# Patient Record
Sex: FEMALE | Race: WHITE | NOT HISPANIC OR LATINO | Employment: UNEMPLOYED | URBAN - NONMETROPOLITAN AREA
[De-identification: names, ages, dates, MRNs, and addresses within clinical notes are randomized per-mention and may not be internally consistent; named-entity substitution may affect disease eponyms.]

---

## 2024-11-13 NOTE — PROGRESS NOTES
"Patient: Little Ty    YOB: 1985    Date: 11/18/2024    Primary Care Provider: Nallely Sims MD    Chief Complaint   Patient presents with    Hernia       SUBJECTIVE:    History of present illness:  The patient is in the office today for evaluation and treatment of a mass in her abdomen. she states that she was weightlifting and noticed she has\" lumps in her abdomen\". She has a history of an inguinal hernia as a child. The patient states she has bilateral groin pain. She complains of bulging in the area.     The patient has had bilateral inguinal hernias repaired as a child, she does state that she has some pain in the right groin with a palpable lump.  She does work out quite a bit and noticed this recently while working out.    The following portions of the patient's history were reviewed and updated as appropriate: allergies, current medications, past family history, past medical history, past social history, past surgical history and problem list.      Review of Systems   Constitutional:  Negative for chills, fever and unexpected weight change.   HENT:  Negative for hearing loss, trouble swallowing and voice change.    Eyes:  Negative for visual disturbance.   Respiratory:  Negative for apnea, cough, chest tightness, shortness of breath and wheezing.    Cardiovascular:  Negative for chest pain, palpitations and leg swelling.   Gastrointestinal:  Negative for abdominal distention, abdominal pain, anal bleeding, blood in stool, constipation, diarrhea, nausea, rectal pain and vomiting.   Endocrine: Negative for cold intolerance and heat intolerance.   Genitourinary:  Negative for difficulty urinating, dysuria and flank pain.   Musculoskeletal:  Negative for back pain and gait problem.   Skin:  Negative for color change, rash and wound.   Neurological:  Negative for dizziness, syncope, speech difficulty, weakness, light-headedness, numbness and headaches.   Hematological:  Negative " "for adenopathy. Does not bruise/bleed easily.   Psychiatric/Behavioral:  Negative for confusion. The patient is not nervous/anxious.          Allergies:  Allergies   Allergen Reactions    Nsaids Nausea Only       Medications:    Current Outpatient Medications:     b complex vitamins capsule, Take 1 capsule by mouth Daily., Disp: , Rfl:     magnesium oxide (MAG-OX) 400 MG tablet, Take 1 tablet by mouth Daily., Disp: , Rfl:     sucralfate (CARAFATE) 1 g tablet, Take 1 tablet by mouth., Disp: , Rfl:     History:  History reviewed. No pertinent past medical history.    Past Surgical History:   Procedure Laterality Date    HYSTERECTOMY      INGUINAL HERNIA REPAIR Bilateral        Family History   Problem Relation Age of Onset    Breast cancer Mother     Breast cancer Maternal Grandmother        Social History     Tobacco Use    Smoking status: Never    Smokeless tobacco: Never   Vaping Use    Vaping status: Never Used   Substance Use Topics    Alcohol use: Yes    Drug use: Defer        OBJECTIVE:    Vital Signs:   Vitals:    11/18/24 1051   BP: 108/68   Pulse: 78   Temp: 97.7 °F (36.5 °C)   SpO2: 98%   Weight: 66.2 kg (146 lb)   Height: 160 cm (62.99\")       Physical Exam:       Physical Exam:     General Appearance:    Alert, cooperative, in no acute distress   Head:    Normocephalic, without obvious abnormality, atraumatic   Eyes:            Lids and lashes normal, conjunctivae and sclerae normal, no   icterus, no pallor, corneas clear, PERRLA   Ears:    Ears appear intact with no abnormalities noted   Throat:   No oral lesions, no thrush, oral mucosa moist   Neck:   No adenopathy, supple, trachea midline, no thyromegaly, no   carotid bruit, no JVD   Back:     No kyphosis present, no scoliosis present, no skin lesions,      erythema or scars, no tenderness to percussion or                   palpation,   range of motion normal   Lungs:     Clear to auscultation,respirations regular, even and                  unlabored "    Heart:    Regular rhythm and normal rate, normal S1 and S2, no            murmur, no gallop, no rub, no click   Chest Wall:    No abnormalities observed   Abdomen:     Normal bowel sounds, no masses, no organomegaly, soft        non-tender, non-distended, no guarding, tenderness present in the right groin   Extremities:   Moves all extremities well, no edema, no cyanosis, no             redness   Pulses:   Pulses palpable and equal bilaterally   Skin:   No bleeding, bruising or rash   Lymph nodes:   No palpable adenopathy   Neurologic:   Cranial nerves 2 - 12 grossly intact, sensation intact, DTR       present and equal bilaterally       Results Review:   I reviewed the patient's new clinical results.  I reviewed the patient's new imaging results and agree with the interpretation.  I reviewed the patient's other test results and agree with the interpretation    Review of Systems was reviewed and confirmed as accurate as documented by the MA.    ASSESSMENT/PLAN:    1. Umbilical hernia without obstruction and without gangrene    2. Right lower quadrant abdominal pain        I did have a detailed and extensive discussion with the patient in the office today and she did have a right groin ultrasound performed.  This did show evidence of a small inguinal hernia but I am not for sure whether she has a muscle strain or the hernia is causing her symptomatology.  I am concerned that she will eventually need robotic assisted laparoscopic repair but I would first like to see her back in the office in 1 week after having the patient not perform any workouts or heavy lifting.          Electronically signed by Philippe Faith MD  11/25/24

## 2024-11-18 ENCOUNTER — OFFICE VISIT (OUTPATIENT)
Dept: SURGERY | Facility: CLINIC | Age: 39
End: 2024-11-18
Payer: MEDICAID

## 2024-11-18 VITALS
HEART RATE: 78 BPM | DIASTOLIC BLOOD PRESSURE: 68 MMHG | SYSTOLIC BLOOD PRESSURE: 108 MMHG | WEIGHT: 146 LBS | HEIGHT: 63 IN | TEMPERATURE: 97.7 F | OXYGEN SATURATION: 98 % | BODY MASS INDEX: 25.87 KG/M2

## 2024-11-18 DIAGNOSIS — R10.31 RIGHT LOWER QUADRANT ABDOMINAL PAIN: ICD-10-CM

## 2024-11-18 DIAGNOSIS — K42.9 UMBILICAL HERNIA WITHOUT OBSTRUCTION AND WITHOUT GANGRENE: Primary | ICD-10-CM

## 2024-11-18 PROCEDURE — 1160F RVW MEDS BY RX/DR IN RCRD: CPT | Performed by: SURGERY

## 2024-11-18 PROCEDURE — 99204 OFFICE O/P NEW MOD 45 MIN: CPT | Performed by: SURGERY

## 2024-11-18 PROCEDURE — 1159F MED LIST DOCD IN RCRD: CPT | Performed by: SURGERY

## 2024-11-18 RX ORDER — SUCRALFATE 1 G/1
1 TABLET ORAL
COMMUNITY
Start: 2024-07-05

## 2024-11-18 RX ORDER — MAGNESIUM OXIDE 400 MG/1
400 TABLET ORAL DAILY
COMMUNITY

## 2024-11-18 RX ORDER — VITAMIN B COMPLEX
1 CAPSULE ORAL DAILY
COMMUNITY

## 2024-11-19 ENCOUNTER — PATIENT ROUNDING (BHMG ONLY) (OUTPATIENT)
Dept: SURGERY | Facility: CLINIC | Age: 39
End: 2024-11-19
Payer: MEDICAID

## 2024-11-19 NOTE — PROGRESS NOTES
November 19, 2024    Hello, may I speak with Little Ty?    My name is BULMARO DOYLE    I am  with [facility]  [address]  [place] [facility] [place] [address] [phone].    Before we get started may I verify your date of birth? 1985    I am calling to officially welcome you to our practice and ask about your recent visit. Is this a good time to talk? yes    Tell me about your visit with us. What things went well?  IT WAS OK       We're always looking for ways to make our patients' experiences even better. Do you have recommendations on ways we may improve?  yes I THINK HE COULD HAVE TOLD ME WHETHER OR NOT HE THOUGHT I HAD A HERNIA INSTEAD OF HAVING TO COME BACK IN A WEEK    Overall were you satisfied with your first visit to our practice? yes       I appreciate you taking the time to speak with me today. Is there anything else I can do for you? no      Thank you, and have a great day.

## 2024-11-21 NOTE — PROGRESS NOTES
Patient: Little Ty    YOB: 1985    Date: 11/25/2024    Primary Care Provider: Nallely Sims MD    Chief Complaint   Patient presents with    Follow-up     R inguinal/incisional hernia        SUBJECTIVE:    History of present illness:  I saw the patient in the office today as a follow-up consultation for evaluation and treatment of a right inguinal/incisional hernia.  Patient was seen in the office 11/18/2024 at which time she complained of bilateral inguinal pain.  Ultrasound was performed which showed a possible hernia in her right groin area.    She still complains of abdominal pain in the right groin region, there is a palpable mass.    The following portions of the patient's history were reviewed and updated as appropriate: allergies, current medications, past family history, past medical history, past social history, past surgical history and problem list.      Review of Systems:  Constitutional:  Negative for chills, fever, and unexpected weight change.  HENT: Negative for trouble swallowing and voice change.  Eyes:  Negative for visual disturbance.  Respiratory:  Negative for apnea, cough, chest tightness, shortness of breath, and wheezing.  Cardiovascular:  Negative for chest pain, palpitations, and leg swelling.  Gastrointestinal:  Negative for abdominal distention, abdominal pain, anal bleeding, blood in stool, constipation, diarrhea, nausea, rectal pain, and vomiting.  Musculoskeletal:  Negative for back pain, gait problem, and joint swelling.  Skin:  Negative for color change, rash, and wound  Neurological:  Negative for dizziness, syncope, speech difficulty, weakness, numbness, and headaches.  Hematological:  Negative for adenopathy.  Does not bruise/bleed easily.  Psychiatric/Behavioral:  Negative for confusion.  The patient is not nervous/anxious.          History:  History reviewed. No pertinent past medical history.    Past Surgical History:   Procedure Laterality  "Date    HYSTERECTOMY      INGUINAL HERNIA REPAIR Bilateral        Family History   Problem Relation Age of Onset    Breast cancer Mother     Breast cancer Maternal Grandmother        Social History     Tobacco Use    Smoking status: Never    Smokeless tobacco: Never   Vaping Use    Vaping status: Never Used   Substance Use Topics    Alcohol use: Yes    Drug use: Defer       Allergies:  Allergies   Allergen Reactions    Nsaids Nausea Only       Medications:    Current Outpatient Medications:     b complex vitamins capsule, Take 1 capsule by mouth Daily., Disp: , Rfl:     magnesium oxide (MAG-OX) 400 MG tablet, Take 1 tablet by mouth Daily., Disp: , Rfl:     sucralfate (CARAFATE) 1 g tablet, Take 1 tablet by mouth., Disp: , Rfl:     OBJECTIVE:    Vital Signs:   Vitals:    11/25/24 0845   BP: 104/72   Pulse: 70   Temp: 98 °F (36.7 °C)   SpO2: 98%   Weight: 66.2 kg (146 lb)   Height: 160 cm (62.99\")         Physical Exam:     General Appearance:    Alert, cooperative, in no acute distress   Head:    Normocephalic, without obvious abnormality, atraumatic   Eyes:            Lids and lashes normal, conjunctivae and sclerae normal, no   icterus, no pallor, corneas clear, PERRLA   Ears:    Ears appear intact with no abnormalities noted   Throat:   No oral lesions, no thrush, oral mucosa moist   Neck:   No adenopathy, supple, trachea midline, no thyromegaly, no   carotid bruit, no JVD   Back:     No kyphosis present, no scoliosis present, no skin lesions,      erythema or scars, no tenderness to percussion or                   palpation,   range of motion normal   Lungs:     Clear to auscultation,respirations regular, even and                  unlabored    Heart:    Regular rhythm and normal rate, normal S1 and S2, no            murmur, no gallop, no rub, no click   Chest Wall:    No abnormalities observed   Abdomen:     Normal bowel sounds, no masses, no organomegaly, soft        non-tender, non-distended, no guarding, " tenderness in the right groin with small palpable mass   Extremities:   Moves all extremities well, no edema, no cyanosis, no             redness   Pulses:   Pulses palpable and equal bilaterally   Skin:   No bleeding, bruising or rash   Lymph nodes:   No palpable adenopathy   Neurologic:   Cranial nerves 2 - 12 grossly intact, sensation intact, DTR       present and equal bilaterally         Results Review:   I reviewed the patient's new clinical results.  I reviewed the patient's new imaging results and agree with the interpretation.  I reviewed the patient's other test results and agree with the interpretation    Review of Systems was reviewed and confirmed as accurate as documented by the MA.    ASSESSMENT/PLAN:    1. Recurrent inguinal hernia without obstruction or gangrene, unspecified laterality        I had a detailed and extensive discussion with the patient in the office and they understand that they need to undergo robotic laparoscopic assisted right inguinal hernia repair with mesh.  Full risks and benefits of operative versus nonoperative intervention were discussed with the patient and these included things such as nonresolution of symptoms and possible worsening of symptoms without surgical intervention versus infection, bleeding, possible recurrent hernia, possible postoperative neuralgia from nerve damage or involvement with scar tissue, etc.  The patient understands, agrees, and had no questions for me at the end of the office visit.     I discussed the patients findings and my recommendations with patient.    Electronically signed by Philippe Faith MD  11/25/24

## 2024-11-25 ENCOUNTER — OFFICE VISIT (OUTPATIENT)
Dept: SURGERY | Facility: CLINIC | Age: 39
End: 2024-11-25
Payer: MEDICAID

## 2024-11-25 VITALS
TEMPERATURE: 98 F | HEIGHT: 63 IN | SYSTOLIC BLOOD PRESSURE: 104 MMHG | WEIGHT: 146 LBS | DIASTOLIC BLOOD PRESSURE: 72 MMHG | OXYGEN SATURATION: 98 % | HEART RATE: 70 BPM | BODY MASS INDEX: 25.87 KG/M2

## 2024-11-25 DIAGNOSIS — K40.91 RECURRENT INGUINAL HERNIA WITHOUT OBSTRUCTION OR GANGRENE, UNSPECIFIED LATERALITY: Primary | ICD-10-CM

## 2024-11-25 PROCEDURE — 1159F MED LIST DOCD IN RCRD: CPT | Performed by: SURGERY

## 2024-11-25 PROCEDURE — 1160F RVW MEDS BY RX/DR IN RCRD: CPT | Performed by: SURGERY

## 2024-11-25 PROCEDURE — 99213 OFFICE O/P EST LOW 20 MIN: CPT | Performed by: SURGERY

## 2024-11-26 ENCOUNTER — TELEPHONE (OUTPATIENT)
Dept: SURGERY | Facility: CLINIC | Age: 39
End: 2024-11-26
Payer: MEDICAID

## 2024-11-27 NOTE — TELEPHONE ENCOUNTER
Spoke with patient and she is scheduled for hernia surgery with Dr. Faith on 12/03/24. Preop instructions given, pt verbalized understanding. Documented in OR book.

## 2024-12-02 ENCOUNTER — PRE-ADMISSION TESTING (OUTPATIENT)
Dept: PREADMISSION TESTING | Facility: HOSPITAL | Age: 39
End: 2024-12-02
Payer: MEDICAID

## 2024-12-02 VITALS — BODY MASS INDEX: 25.48 KG/M2 | HEIGHT: 63 IN | WEIGHT: 143.8 LBS

## 2024-12-02 LAB
ANION GAP SERPL CALCULATED.3IONS-SCNC: 9.4 MMOL/L (ref 5–15)
BUN SERPL-MCNC: 11 MG/DL (ref 6–20)
BUN/CREAT SERPL: 13.8 (ref 7–25)
CALCIUM SPEC-SCNC: 9.3 MG/DL (ref 8.6–10.5)
CHLORIDE SERPL-SCNC: 103 MMOL/L (ref 98–107)
CO2 SERPL-SCNC: 25.6 MMOL/L (ref 22–29)
CREAT SERPL-MCNC: 0.8 MG/DL (ref 0.57–1)
DEPRECATED RDW RBC AUTO: 40.6 FL (ref 37–54)
EGFRCR SERPLBLD CKD-EPI 2021: 96.9 ML/MIN/1.73
ERYTHROCYTE [DISTWIDTH] IN BLOOD BY AUTOMATED COUNT: 12.3 % (ref 12.3–15.4)
GLUCOSE SERPL-MCNC: 92 MG/DL (ref 65–99)
HCT VFR BLD AUTO: 38.5 % (ref 34–46.6)
HGB BLD-MCNC: 12.9 G/DL (ref 12–15.9)
MCH RBC QN AUTO: 30.1 PG (ref 26.6–33)
MCHC RBC AUTO-ENTMCNC: 33.5 G/DL (ref 31.5–35.7)
MCV RBC AUTO: 89.7 FL (ref 79–97)
PLATELET # BLD AUTO: 293 10*3/MM3 (ref 140–450)
PMV BLD AUTO: 10 FL (ref 6–12)
POTASSIUM SERPL-SCNC: 4.5 MMOL/L (ref 3.5–5.2)
RBC # BLD AUTO: 4.29 10*6/MM3 (ref 3.77–5.28)
SODIUM SERPL-SCNC: 138 MMOL/L (ref 136–145)
WBC NRBC COR # BLD AUTO: 4.24 10*3/MM3 (ref 3.4–10.8)

## 2024-12-02 PROCEDURE — 80048 BASIC METABOLIC PNL TOTAL CA: CPT

## 2024-12-02 PROCEDURE — 85027 COMPLETE CBC AUTOMATED: CPT

## 2024-12-02 PROCEDURE — 36415 COLL VENOUS BLD VENIPUNCTURE: CPT

## 2024-12-02 NOTE — DISCHARGE INSTRUCTIONS
Pre-Admission testing appointment completed today for patient's upcoming procedure here at [facility].    PAT PASS reviewed with patient and they verbalize understanding of the following:     Do not eat or drink anything after midnight the night before procedure unless otherwise instructed by physician/surgeon's office, this includes no gum, candy, mints, tobacco products or e-cigarettes.  Do not shave the area to be operated on at least 48 hours prior to procedure.  Do not wear makeup, lotion, hair products, or nail polish.  Do not wear any jewelry and remove all piercings.  Do not wear any adhesive if you wear dentures.  Do not wear contacts; bring in glasses if needed.  Only take medications on the morning of procedure as instructed by PAT nurse per anesthesia guidelines or as instructed by physician's office.  If you are on any blood thinners reach out to the physician/surgeon's office for instructions on when/if they will need to be stopped prior to procedure.  Bring in picture ID and insurance card, advanced directive copies if applicable, CPAP/BIPAP/Inhalers if indicated morning of procedure, leave any other valuables at home.  Ensure you have arranged for someone to drive you home the day of your procedure and someone to care for you at home afterwards. It is recommended that you do not drive, drink alcohol, or make any major legal decisions for at least 24 hours after your procedure is complete.  Chlorhexadine sponge along with instruction/information sheet given to patient. Readybath wipes given in lieu of CHG if patient has CHG allergy. Instructed patient to date, time, and initial the verification sheet once skin prep has been completed, and to return to [facility] the day of the procedure.  ERAS instructions given to patient unless otherwise instructed per surgeon's orders.     Introduction to anesthesia video watched by patient during appointment and anesthesia FAQ tip sheet given to  patient.  Instructions and information given to patient about parking, hospital entrance, and registration location.

## 2024-12-03 ENCOUNTER — ANESTHESIA (OUTPATIENT)
Dept: PERIOP | Facility: HOSPITAL | Age: 39
End: 2024-12-03
Payer: MEDICAID

## 2024-12-03 ENCOUNTER — ANESTHESIA EVENT CONVERTED (OUTPATIENT)
Dept: ANESTHESIOLOGY | Facility: HOSPITAL | Age: 39
End: 2024-12-03
Payer: MEDICAID

## 2024-12-03 ENCOUNTER — HOSPITAL ENCOUNTER (OUTPATIENT)
Facility: HOSPITAL | Age: 39
Setting detail: HOSPITAL OUTPATIENT SURGERY
Discharge: HOME | End: 2024-12-03
Attending: SURGERY | Admitting: SURGERY
Payer: MEDICAID

## 2024-12-03 ENCOUNTER — ANESTHESIA EVENT (OUTPATIENT)
Dept: PERIOP | Facility: HOSPITAL | Age: 39
End: 2024-12-03
Payer: MEDICAID

## 2024-12-03 VITALS
OXYGEN SATURATION: 97 % | SYSTOLIC BLOOD PRESSURE: 106 MMHG | HEART RATE: 78 BPM | RESPIRATION RATE: 16 BRPM | DIASTOLIC BLOOD PRESSURE: 83 MMHG | TEMPERATURE: 97.1 F

## 2024-12-03 DIAGNOSIS — K40.91 RECURRENT INGUINAL HERNIA WITHOUT OBSTRUCTION OR GANGRENE, UNSPECIFIED LATERALITY: ICD-10-CM

## 2024-12-03 PROCEDURE — C1781 MESH (IMPLANTABLE): HCPCS | Performed by: SURGERY

## 2024-12-03 PROCEDURE — 49505 PRP I/HERN INIT REDUC >5 YR: CPT | Performed by: SURGERY

## 2024-12-03 PROCEDURE — 25010000002 ROPIVACAINE PER 1 MG: Performed by: NURSE ANESTHETIST, CERTIFIED REGISTERED

## 2024-12-03 PROCEDURE — 25010000002 MIDAZOLAM PER 1MG: Performed by: NURSE ANESTHETIST, CERTIFIED REGISTERED

## 2024-12-03 PROCEDURE — 25010000002 HYDROMORPHONE PER 4 MG: Performed by: NURSE ANESTHETIST, CERTIFIED REGISTERED

## 2024-12-03 PROCEDURE — 25010000002 ONDANSETRON PER 1 MG: Performed by: NURSE ANESTHETIST, CERTIFIED REGISTERED

## 2024-12-03 PROCEDURE — 25010000002 LIDOCAINE (CARDIAC): Performed by: NURSE ANESTHETIST, CERTIFIED REGISTERED

## 2024-12-03 PROCEDURE — 25010000002 HYDROMORPHONE 1 MG/ML SOLUTION: Performed by: NURSE ANESTHETIST, CERTIFIED REGISTERED

## 2024-12-03 PROCEDURE — 25010000002 BUPIVACAINE (PF) 0.5 % SOLUTION: Performed by: SURGERY

## 2024-12-03 PROCEDURE — 25010000002 CEFAZOLIN PER 500 MG: Performed by: SURGERY

## 2024-12-03 PROCEDURE — 25810000003 LACTATED RINGERS PER 1000 ML: Performed by: NURSE ANESTHETIST, CERTIFIED REGISTERED

## 2024-12-03 PROCEDURE — 25010000002 DEXAMETHASONE PER 1 MG: Performed by: NURSE ANESTHETIST, CERTIFIED REGISTERED

## 2024-12-03 PROCEDURE — 25010000002 SUGAMMADEX 200 MG/2ML SOLUTION: Performed by: NURSE ANESTHETIST, CERTIFIED REGISTERED

## 2024-12-03 PROCEDURE — 25010000002 PROPOFOL 10 MG/ML EMULSION: Performed by: NURSE ANESTHETIST, CERTIFIED REGISTERED

## 2024-12-03 PROCEDURE — 25010000002 FENTANYL CITRATE (PF) 50 MCG/ML SOLUTION PREFILLED SYRINGE: Performed by: NURSE ANESTHETIST, CERTIFIED REGISTERED

## 2024-12-03 DEVICE — IMPLANTABLE DEVICE: Type: IMPLANTABLE DEVICE | Site: ABDOMEN | Status: FUNCTIONAL

## 2024-12-03 DEVICE — DEV WND/CLS STRATAFIX SPIRALPDS PLS CT 2/0 15CM 26MM VIL: Type: IMPLANTABLE DEVICE | Site: ABDOMEN | Status: FUNCTIONAL

## 2024-12-03 DEVICE — 3DMAX MID ANATOMICAL MESH, 10 CM X 16 CM (4" X 6"), LARGE, RIGHT
Type: IMPLANTABLE DEVICE | Site: ABDOMEN | Status: FUNCTIONAL
Brand: 3DMAX

## 2024-12-03 RX ORDER — HYDROCODONE BITARTRATE AND ACETAMINOPHEN 7.5; 325 MG/1; MG/1
1 TABLET ORAL EVERY 6 HOURS PRN
Qty: 15 TABLET | Refills: 0 | Status: SHIPPED | OUTPATIENT
Start: 2024-12-03

## 2024-12-03 RX ORDER — ONDANSETRON 2 MG/ML
4 INJECTION INTRAMUSCULAR; INTRAVENOUS ONCE AS NEEDED
Status: DISCONTINUED | OUTPATIENT
Start: 2024-12-03 | End: 2024-12-03 | Stop reason: HOSPADM

## 2024-12-03 RX ORDER — ONDANSETRON 2 MG/ML
INJECTION INTRAMUSCULAR; INTRAVENOUS AS NEEDED
Status: DISCONTINUED | OUTPATIENT
Start: 2024-12-03 | End: 2024-12-03 | Stop reason: SURG

## 2024-12-03 RX ORDER — SODIUM CHLORIDE, SODIUM LACTATE, POTASSIUM CHLORIDE, CALCIUM CHLORIDE 600; 310; 30; 20 MG/100ML; MG/100ML; MG/100ML; MG/100ML
INJECTION, SOLUTION INTRAVENOUS CONTINUOUS PRN
Status: DISCONTINUED | OUTPATIENT
Start: 2024-12-03 | End: 2024-12-03 | Stop reason: SURG

## 2024-12-03 RX ORDER — MIDAZOLAM HYDROCHLORIDE 2 MG/2ML
INJECTION, SOLUTION INTRAMUSCULAR; INTRAVENOUS AS NEEDED
Status: DISCONTINUED | OUTPATIENT
Start: 2024-12-03 | End: 2024-12-03 | Stop reason: SURG

## 2024-12-03 RX ORDER — HYDROMORPHONE HYDROCHLORIDE 2 MG/ML
INJECTION, SOLUTION INTRAMUSCULAR; INTRAVENOUS; SUBCUTANEOUS AS NEEDED
Status: DISCONTINUED | OUTPATIENT
Start: 2024-12-03 | End: 2024-12-03 | Stop reason: SURG

## 2024-12-03 RX ORDER — BUPIVACAINE HYDROCHLORIDE 5 MG/ML
INJECTION, SOLUTION EPIDURAL; INTRACAUDAL AS NEEDED
Status: DISCONTINUED | OUTPATIENT
Start: 2024-12-03 | End: 2024-12-03 | Stop reason: HOSPADM

## 2024-12-03 RX ORDER — PHENYLEPHRINE HCL IN 0.9% NACL 1 MG/10 ML
SYRINGE (ML) INTRAVENOUS AS NEEDED
Status: DISCONTINUED | OUTPATIENT
Start: 2024-12-03 | End: 2024-12-03 | Stop reason: SURG

## 2024-12-03 RX ORDER — ROPIVACAINE HYDROCHLORIDE 5 MG/ML
INJECTION, SOLUTION EPIDURAL; INFILTRATION; PERINEURAL
Status: COMPLETED | OUTPATIENT
Start: 2024-12-03 | End: 2024-12-03

## 2024-12-03 RX ORDER — PROPOFOL 10 MG/ML
VIAL (ML) INTRAVENOUS AS NEEDED
Status: DISCONTINUED | OUTPATIENT
Start: 2024-12-03 | End: 2024-12-03 | Stop reason: SURG

## 2024-12-03 RX ORDER — FENTANYL CITRATE 50 UG/ML
INJECTION, SOLUTION INTRAMUSCULAR; INTRAVENOUS AS NEEDED
Status: DISCONTINUED | OUTPATIENT
Start: 2024-12-03 | End: 2024-12-03 | Stop reason: SURG

## 2024-12-03 RX ORDER — ACETAMINOPHEN 500 MG
1000 TABLET ORAL ONCE
Status: COMPLETED | OUTPATIENT
Start: 2024-12-03 | End: 2024-12-03

## 2024-12-03 RX ORDER — HYDROCODONE BITARTRATE AND ACETAMINOPHEN 7.5; 325 MG/1; MG/1
1 TABLET ORAL ONCE AS NEEDED
Status: COMPLETED | OUTPATIENT
Start: 2024-12-03 | End: 2024-12-03

## 2024-12-03 RX ORDER — KETAMINE HCL IN NACL, ISO-OSM 100MG/10ML
SYRINGE (ML) INJECTION AS NEEDED
Status: DISCONTINUED | OUTPATIENT
Start: 2024-12-03 | End: 2024-12-03 | Stop reason: SURG

## 2024-12-03 RX ORDER — ROCURONIUM BROMIDE 50 MG/5 ML
SYRINGE (ML) INTRAVENOUS AS NEEDED
Status: DISCONTINUED | OUTPATIENT
Start: 2024-12-03 | End: 2024-12-03 | Stop reason: SURG

## 2024-12-03 RX ORDER — DEXAMETHASONE SODIUM PHOSPHATE 4 MG/ML
INJECTION, SOLUTION INTRA-ARTICULAR; INTRALESIONAL; INTRAMUSCULAR; INTRAVENOUS; SOFT TISSUE AS NEEDED
Status: DISCONTINUED | OUTPATIENT
Start: 2024-12-03 | End: 2024-12-03 | Stop reason: SURG

## 2024-12-03 RX ADMIN — SUGAMMADEX 200 MG: 100 INJECTION, SOLUTION INTRAVENOUS at 11:16

## 2024-12-03 RX ADMIN — PROPOFOL 200 MG: 10 INJECTION, EMULSION INTRAVENOUS at 09:57

## 2024-12-03 RX ADMIN — Medication 100 MCG: at 10:10

## 2024-12-03 RX ADMIN — SODIUM CHLORIDE, POTASSIUM CHLORIDE, SODIUM LACTATE AND CALCIUM CHLORIDE: 600; 310; 30; 20 INJECTION, SOLUTION INTRAVENOUS at 09:53

## 2024-12-03 RX ADMIN — HYDROMORPHONE HYDROCHLORIDE 1 MG: 2 INJECTION INTRAMUSCULAR; INTRAVENOUS; SUBCUTANEOUS at 11:16

## 2024-12-03 RX ADMIN — Medication 25 MG: at 09:57

## 2024-12-03 RX ADMIN — Medication 10 MG: at 10:14

## 2024-12-03 RX ADMIN — HYDROMORPHONE HYDROCHLORIDE 1 MG: 2 INJECTION INTRAMUSCULAR; INTRAVENOUS; SUBCUTANEOUS at 10:20

## 2024-12-03 RX ADMIN — HYDROCODONE BITARTRATE AND ACETAMINOPHEN 1 TABLET: 7.5; 325 TABLET ORAL at 13:00

## 2024-12-03 RX ADMIN — ONDANSETRON 4 MG: 2 INJECTION INTRAMUSCULAR; INTRAVENOUS at 09:53

## 2024-12-03 RX ADMIN — FENTANYL CITRATE 50 MCG: 50 INJECTION, SOLUTION INTRAMUSCULAR; INTRAVENOUS at 09:57

## 2024-12-03 RX ADMIN — ROPIVACAINE HYDROCHLORIDE 30 ML: 5 INJECTION, SOLUTION EPIDURAL; INFILTRATION; PERINEURAL at 10:09

## 2024-12-03 RX ADMIN — FENTANYL CITRATE 50 MCG: 50 INJECTION, SOLUTION INTRAMUSCULAR; INTRAVENOUS at 10:14

## 2024-12-03 RX ADMIN — ACETAMINOPHEN 1000 MG: 500 TABLET, FILM COATED ORAL at 08:51

## 2024-12-03 RX ADMIN — Medication 100 MCG: at 10:15

## 2024-12-03 RX ADMIN — LIDOCAINE HYDROCHLORIDE 40 MG: 20 INJECTION, SOLUTION INTRAVENOUS at 09:57

## 2024-12-03 RX ADMIN — SODIUM CHLORIDE 2000 MG: 9 INJECTION, SOLUTION INTRAVENOUS at 09:53

## 2024-12-03 RX ADMIN — MIDAZOLAM HYDROCHLORIDE 2 MG: 1 INJECTION, SOLUTION INTRAMUSCULAR; INTRAVENOUS at 09:53

## 2024-12-03 RX ADMIN — Medication 40 MG: at 09:57

## 2024-12-03 RX ADMIN — Medication 25 MG: at 10:14

## 2024-12-03 RX ADMIN — HYDROMORPHONE HYDROCHLORIDE 0.5 MG: 1 INJECTION, SOLUTION INTRAMUSCULAR; INTRAVENOUS; SUBCUTANEOUS at 12:00

## 2024-12-03 RX ADMIN — DEXAMETHASONE SODIUM PHOSPHATE 4 MG: 4 INJECTION, SOLUTION INTRA-ARTICULAR; INTRALESIONAL; INTRAMUSCULAR; INTRAVENOUS; SOFT TISSUE at 10:08

## 2024-12-03 NOTE — ANESTHESIA POSTPROCEDURE EVALUATION
Patient: Little Ty    Procedure Summary       Date: 12/03/24 Room / Location: [facility] OR 2 /  [facility]    Anesthesia Start: 0953 Anesthesia Stop: 1135    Procedure: INGUINAL HERNIA REPAIR BILATERAL LAPAROSCOPIC WITH DAVINCI ROBOT (Bilateral: Abdomen) Diagnosis:       Recurrent inguinal hernia without obstruction or gangrene, unspecified laterality      (Recurrent inguinal hernia without obstruction or gangrene, unspecified laterality [K40.91])    Surgeons: Philippe Faith MD Provider: Pelon Mckeon CRNA    Anesthesia Type: general ASA Status: 1            Anesthesia Type: general    Vitals  No vitals data found for the desired time range.          Post Anesthesia Care and Evaluation    Patient location during evaluation: PACU  Patient participation: complete - patient participated  Level of consciousness: awake and alert  Pain score: 2  Pain management: satisfactory to patient    Airway patency: patent  Anesthetic complications: No anesthetic complications  PONV Status: none  Cardiovascular status: acceptable and stable  Respiratory status: acceptable and spontaneous ventilation  Hydration status: acceptable    Comments: Vitals signs as noted in nursing documentation as per protocol.

## 2024-12-03 NOTE — ANESTHESIA PREPROCEDURE EVALUATION
Anesthesia Evaluation     Patient summary reviewed and Nursing notes reviewed   no history of anesthetic complications:   NPO Solid Status: > 8 hours  NPO Liquid Status: > 8 hours           Airway   Mallampati: I  TM distance: >3 FB  Neck ROM: full  no difficulty expected  Dental - normal exam     Pulmonary - negative pulmonary ROS and normal exam   Cardiovascular - negative cardio ROS and normal exam        Neuro/Psych- negative ROS  GI/Hepatic/Renal/Endo - negative ROS     Musculoskeletal (-) negative ROS    Abdominal    Substance History - negative use     OB/GYN negative ob/gyn ROS         Other - negative ROS                   Anesthesia Plan    ASA 1     general     intravenous induction     Anesthetic plan, risks, benefits, and alternatives have been provided, discussed and informed consent has been obtained with: patient.    CODE STATUS:

## 2024-12-03 NOTE — INTERVAL H&P NOTE
H&P reviewed. The patient was examined and there are no changes to the H&P.      I did have a detailed discussion with the patient and the  preoperatively, they are concerned about the possibility of a left inguinal hernia since she does have pain on that side.  I told them that I would inspect this at the time of operative intervention and if I find a hernia she will undergo left inguinal herniorrhaphy with mesh implantation also in addition to the previously planned right sided repair.  Risk and benefits were explained to her, she understands and agrees, and wants me to repair of the left side if I find a hernia at the time of diagnostic laparoscopy.  They had no questions for me at the end of the discussion.       89

## 2024-12-03 NOTE — OP NOTE
PATIENT:    Little Ty    DATE OF SURGERY:   12/3/2024    PHYSICIAN:    Philippe Faith MD    REFERRING PHYSICIAN:  Nallely Sims MD    YOB: 1985    PREOPERATIVE DIAGNOSIS:  Bilateral indirect inguinal hernia    POSTOPERATIVE DIAGNOSIS: Bilateralindirect inguinal hernia    PROCEDURE: Robotic Bilateral inguinal herniorrhaphy with 3-D Max Mid Large mesh    EBL:  Less than 50 cc    COMPLICATIONS:  None    HISTORY:  The patient presents to me for evaluation and treatment of a history significant for a bilateral inguinal hernia.  They are here now for robotic repair with mesh.    ANESTHESIA:  General endotracheal.    OPERATIVE PROCEDURE:  The patient was taken to the operating room, placed in the supine position, and given general endotracheal anesthesia per the Anesthesia service.  The patient was prepped and draped in the normal sterile fashion and the patient was given preoperative IV antibiotics.  An appropriate timeout per the nursing staff was performed prior to the incision.  I did meet with the patient preoperatively and marked them accordingly.    I did make an incision in the midline approximately 4 cm superior to the umbilicus and then inserted a Veress needle to insufflate the abdomen with CO2.  An 8 mm Optiview robotic trocar was inserted carefully and the abdominal cavity was entered.  Under direct vision separate 8 mm right and left robotic trocars were inserted after incisions were made in these locations.  There was good visualization performed and there was evidence of a Bilateral indirect inguinal hernia.    Good exposure was obtained, we did score the peritoneum with robotic scissors and electrocautery and then brought down the peritoneal flap carefully dissecting in this avascular plane both laterally and medially.  We were able to identify the pubic tubercle and Connor's ligament, there was evidence of the above-mentioned hernia defect.  We continued our dissection  laterally carefully leaving fatty tissue on the abdominal wall in order to avoid nerve structures.  There was evidence of a lipoma, this was reduced bilaterally.    We then were able to reduce the inguinal sac without difficulty, the round ligament was noted in it's normal position.  It was divided. We were careful to avoid getting close to the iliac vein or artery.  After we had the sac dissected in its entirety and a nice flap of peritoneum inferiorly we were able to place the above-mentioned 3D max mid large mesh in place without difficulty after inserting it through one of the lateral 8 mm trocars.    A single 3-0 Vicryl suture was used near the pubic tubercle to tack the mesh in place, there was excellent coverage of the above-mentioned hernia defect, the peritoneum was reapproximated with a running 2-0 Stratifix spiral suture without difficulty.  At this time the trocars were undocked, the instruments had previously been removed, and then the trocars were removed and 4-0 Vicryl subcuticular stitch and Steri-Strips were used for skin reapproximation.  Local Marcaine with epinephrine was used for postoperative anesthetic.    The patient was stable at this point in time and subsequently transferred back to the recovery room in stable condition.    PLAN:  I did have a detailed discussion with the patient's  [phone], they understand the patient's current condition, the findings at the time of operative intervention, and the treatment plan.    Philippe Faith MD

## 2024-12-03 NOTE — ANESTHESIA PROCEDURE NOTES
Airway  Urgency: elective    Date/Time: 12/3/2024 9:58 AM  End Time:12/3/2024 9:59 AM  Airway not difficult    General Information and Staff    Patient location during procedure: OR  CRNA/CAA: Pelon Mckeon CRNA    Indications and Patient Condition  Indications for airway management: airway protection    Preoxygenated: yes  Mask difficulty assessment: 1 - vent by mask    Final Airway Details  Final airway type: endotracheal airway      Successful airway: ETT  Cuffed: yes   Successful intubation technique: direct laryngoscopy  Facilitating devices/methods: anterior pressure/BURP  Endotracheal tube insertion site: oral  Blade: Wilfredo  Blade size: 3  ETT size (mm): 7.0  Cormack-Lehane Classification: grade I - full view of glottis  Placement verified by: chest auscultation and capnometry   Cuff volume (mL): 5  Measured from: lips  ETT/EBT  to lips (cm): 20  Number of attempts at approach: 1  Assessment: lips, teeth, and gum same as pre-op and atraumatic intubation    Additional Comments  +ETCO2, BBS, atraumatic.

## 2024-12-03 NOTE — ANESTHESIA PROCEDURE NOTES
Peripheral Block      Patient reassessed immediately prior to procedure    Patient location during procedure: OR  Start time: 12/3/2024 10:05 AM  Stop time: 12/3/2024 10:09 AM  Reason for block: at surgeon's request and post-op pain management  Performed by  CRNA/CAA: Mario Martin CRNA  Preanesthetic Checklist  Completed: patient identified, IV checked, site marked, risks and benefits discussed, surgical consent, monitors and equipment checked, pre-op evaluation and timeout performed  Prep:  Pt Position: supine  Sterile barriers:gloves, cap, sterile barriers and mask  Prep: ChloraPrep  Patient monitoring: blood pressure monitoring, continuous pulse oximetry and EKG  Procedure  Performed under: general  Guidance:ultrasound guided    ULTRASOUND INTERPRETATION.  Using ultrasound guidance a 21 G gauge needle was placed in close proximity to the nerve, at which point, under ultrasound guidance anesthetic was injected in the area of the nerve and spread of the anesthesia was seen on ultrasound in close proximity thereto.  There were no abnormalities seen on ultrasound; a digital image was taken; and the patient tolerated the procedure with no complications. Images:still images obtained    Laterality:Bilateral  Block Type:TAP  Injection Technique:single-shot  Needle Type:echogenic  Resistance on Injection: none    Medications Used: ropivacaine (NAROPIN) injection 0.5 % - Peripheral Nerve   30 mL - 12/3/2024 10:09:00 AM      Medications  Preservative Free Saline:30ml  Comment:Block Injection: LA dose divided between Right and Left Block  Adjuncts per total volume of LA:    Decadron 10 mg PSF      If required, intravenous sedation was given -- see meds on anesthesia record.    Post Assessment  Injection Assessment: negative aspiration for heme, no paresthesia on injection and incremental injection  Patient Tolerance:comfortable throughout block  Complications:no  Additional Notes  Procedure:      BILATERAL TAP  BLOCKS                             Patient analgesia was achieved with General Anesthesia    The pt was placed in the Supine Position and under Ultrasound guidance, an echogenic or touhy needle was advanced with Normal Saline hydro dissection of tissue.  The Internal Oblique and Transversus Abdominus muscles were visualized.  At or before the aponeurosis of Internal Oblique, the local anesthetic spread was visualized in the Transversus Abdominus Plane. Injection was made incrementally with aspiration every 5 mls.  There was no intravascular injection;  injection pressure was normal; there was no neural injection; and the procedure was completed without difficulty.    Performed by: Pelon Mckeon CRNA

## 2024-12-16 NOTE — PROGRESS NOTES
"Patient: Little Ty    YOB: 1985    Date: 12/18/2024    Primary Care Provider: Nallely Sims MD    Chief Complaint   Patient presents with    Post-op Hernia       History of present illness:  I saw the patient in the office today as a follow-up from their recent bilateral inguinal hernia repairs which were performed 12/03/2024.  They state that they have done well and are having no complaints.          Vital Signs:   Vitals:    12/18/24 1319   Pulse: 97   Temp: 96.8 °F (36 °C)   SpO2: 100%   Weight: 64.9 kg (143 lb)   Height: 160 cm (62.99\")     Physical Exam:     General : no acute distress  Chest : clear bilaterally  COR : regular rate and rhythm  ABD :  soft nontender, all incisions healed nicely      Assessment / Plan:    1. Post-operative state        I did discuss the situation with the patient today in the office and they have done well from their recent herniorraphy.  I have released the patient back to normal activity, they understand that they need to be careful about heavy lifting.  I need to see the patient back in the office only if they are having further problems, they know to call me if they are.    Electronically signed by Philippe Faith MD  12/18/24              Answers submitted by the patient for this visit:  Post Operative Visit (Submitted on 12/11/2024)  Chief Complaint: Follow-up  Pain Control: partially controlled  Fever: no fever  Diet: poor intake  Activity: returning to normal  Operative Site Issues: No    "

## 2024-12-18 ENCOUNTER — OFFICE VISIT (OUTPATIENT)
Dept: SURGERY | Facility: CLINIC | Age: 39
End: 2024-12-18
Payer: MEDICAID

## 2024-12-18 VITALS
HEIGHT: 63 IN | TEMPERATURE: 96.8 F | WEIGHT: 143 LBS | BODY MASS INDEX: 25.34 KG/M2 | HEART RATE: 97 BPM | OXYGEN SATURATION: 100 %

## 2024-12-18 DIAGNOSIS — Z98.890 POST-OPERATIVE STATE: Primary | ICD-10-CM

## (undated) DEVICE — CANNULA SEAL

## (undated) DEVICE — GOWN,PREVENTION PLUS,XLARGE,STERILE: Brand: MEDLINE

## (undated) DEVICE — PATIENT RETURN ELECTRODE, SINGLE-USE, CONTACT QUALITY MONITORING, ADULT, WITH 9FT CORD, FOR PATIENTS WEIGING OVER 33LBS. (15KG): Brand: MEGADYNE

## (undated) DEVICE — 40583 XL ADVANCED TRENDELENBURG POSITIONING KIT: Brand: 40583 XL ADVANCED TRENDELENBURG POSITIONING KIT

## (undated) DEVICE — CLNSR INST PREKLENZ SOAK/SHLD 6ML MD

## (undated) DEVICE — HDRST POSTN SLOTTED A/

## (undated) DEVICE — SLV SCD CALF HEMOFORCE DVT THERP REPROC MD

## (undated) DEVICE — MEGA SUTURECUT ND: Brand: ENDOWRIST

## (undated) DEVICE — SOL IRR NACL 0.9PCT 1000ML

## (undated) DEVICE — BLADELESS OBTURATOR: Brand: WECK VISTA

## (undated) DEVICE — SYR SLPTP 30CC

## (undated) DEVICE — RICH GENERAL LAPAROSCOPY: Brand: MEDLINE INDUSTRIES, INC.

## (undated) DEVICE — FORCE BIPOLAR: Brand: ENDOWRIST

## (undated) DEVICE — INSUFFLATION NEEDLE TO ESTABLISH PNEUMOPERITONEUM.: Brand: INSUFFLATION NEEDLE

## (undated) DEVICE — 1LYRTR 16FR10ML100%SIL UMS SNP: Brand: MEDLINE INDUSTRIES, INC.

## (undated) DEVICE — ARM DRAPE

## (undated) DEVICE — BAND BAG 36" X 36": Brand: STERIMED

## (undated) DEVICE — MARKER,SKIN,WI/RULER AND LABELS: Brand: MEDLINE

## (undated) DEVICE — GLV SURG SENSICARE W/ALOE PF LF 8.5 STRL

## (undated) DEVICE — MONOPOLAR CURVED SCISSORS: Brand: ENDOWRIST

## (undated) DEVICE — ANTIBACTERIAL VIOLET BRAIDED (POLYGLACTIN 910), SYNTHETIC ABSORBABLE SUTURE: Brand: COATED VICRYL

## (undated) DEVICE — ANTIBACTERIAL UNDYED BRAIDED (POLYGLACTIN 910), SYNTHETIC ABSORBABLE SUTURE: Brand: COATED VICRYL

## (undated) DEVICE — ADHS LIQ MASTISOL 2/3ML

## (undated) DEVICE — ST TBG PNEUMOCLEAR EVAC SMOKE HIFLO

## (undated) DEVICE — COLUMN DRAPE

## (undated) DEVICE — TIP COVER ACCESSORY